# Patient Record
Sex: FEMALE | HISPANIC OR LATINO
[De-identification: names, ages, dates, MRNs, and addresses within clinical notes are randomized per-mention and may not be internally consistent; named-entity substitution may affect disease eponyms.]

---

## 2022-05-02 PROBLEM — Z00.00 ENCOUNTER FOR PREVENTIVE HEALTH EXAMINATION: Status: ACTIVE | Noted: 2022-05-02

## 2022-05-03 ENCOUNTER — APPOINTMENT (OUTPATIENT)
Dept: PEDIATRIC ALLERGY IMMUNOLOGY | Facility: CLINIC | Age: 37
End: 2022-05-03
Payer: COMMERCIAL

## 2022-05-03 VITALS
OXYGEN SATURATION: 98 % | HEIGHT: 69 IN | SYSTOLIC BLOOD PRESSURE: 104 MMHG | HEART RATE: 94 BPM | DIASTOLIC BLOOD PRESSURE: 70 MMHG | BODY MASS INDEX: 39.4 KG/M2 | WEIGHT: 265.99 LBS

## 2022-05-03 DIAGNOSIS — Z78.9 OTHER SPECIFIED HEALTH STATUS: ICD-10-CM

## 2022-05-03 DIAGNOSIS — R09.89 OTHER SPECIFIED SYMPTOMS AND SIGNS INVOLVING THE CIRCULATORY AND RESPIRATORY SYSTEMS: ICD-10-CM

## 2022-05-03 DIAGNOSIS — Z82.5 FAMILY HISTORY OF ASTHMA AND OTHER CHRONIC LOWER RESPIRATORY DISEASES: ICD-10-CM

## 2022-05-03 PROCEDURE — 95004 PERQ TESTS W/ALRGNC XTRCS: CPT

## 2022-05-03 PROCEDURE — 99244 OFF/OP CNSLTJ NEW/EST MOD 40: CPT | Mod: 25

## 2022-05-03 RX ORDER — FEXOFENADINE HYDROCHLORIDE 180 MG/1
180 TABLET ORAL DAILY
Qty: 30 | Refills: 3 | Status: ACTIVE | COMMUNITY
Start: 2022-05-03 | End: 1900-01-01

## 2022-05-03 NOTE — REASON FOR VISIT
[Initial Consultation] : an initial consultation for [Allergy Evaluation/ Skin Testing] : allergy evaluation and or skin testing [Congestion] : congestion [Runny Nose] : runny nose [Itchy Eyes] : itchy eyes [Red Eyes] : red eyes

## 2022-05-03 NOTE — CONSULT LETTER
[Dear  ___] : Dear  [unfilled], [Consult Letter:] : I had the pleasure of evaluating your patient, [unfilled]. [Please see my note below.] : Please see my note below. [Consult Closing:] : Thank you very much for allowing me to participate in the care of this patient.  If you have any questions, please do not hesitate to contact me. [Sincerely,] : Sincerely, [FreeTextEntry2] : FRANCINE DINH [FreeTextEntry3] : Ana Lilia Dhillon MD\par Attending Physician, Division of Allergy/Immunology\par [facility]

## 2022-05-03 NOTE — PHYSICAL EXAM
[Alert] : alert [Well Nourished] : well nourished [Healthy Appearance] : healthy appearance [No Acute Distress] : no acute distress [Well Developed] : well developed [Normal Pupil & Iris Size/Symmetry] : normal pupil and iris size and symmetry [No Discharge] : no discharge [No Photophobia] : no photophobia [Sclera Not Icteric] : sclera not icteric [Normal TMs] : both tympanic membranes were normal [Normal Nasal Mucosa] : the nasal mucosa was normal [Normal Lips/Tongue] : the lips and tongue were normal [Normal Outer Ear/Nose] : the ears and nose were normal in appearance [Normal Tonsils] : normal tonsils [No Thrush] : no thrush [Supple] : the neck was supple [Normal Rate and Effort] : normal respiratory rhythm and effort [No Crackles] : no crackles [No Retractions] : no retractions [Bilateral Audible Breath Sounds] : bilateral audible breath sounds [Normal Rate] : heart rate was normal  [Normal S1, S2] : normal S1 and S2 [Regular Rhythm] : with a regular rhythm [Soft] : abdomen soft [Not Tender] : non-tender [Not Distended] : not distended [No HSM] : no hepato-splenomegaly [Normal Cervical Lymph Nodes] : cervical [Skin Intact] : skin intact  [No Rash] : no rash [No Skin Lesions] : no skin lesions [No clubbing] : no clubbing [No Edema] : no edema [No Cyanosis] : no cyanosis [Alert, Awake, Oriented as Age-Appropriate] : alert, awake, oriented as age appropriate [Suborbital Bogginess] : suborbital bogginess (allergic shiners) [Posterior Pharyngeal Cobblestoning] : posterior pharyngeal cobblestoning [Pale mucosa] : no pale mucosa [Wheezing] : no wheezing was heard

## 2022-05-03 NOTE — REVIEW OF SYSTEMS
[Nl] : Genitourinary [Immunizations are up to date] : Immunizations are up to date [Received Influenza Vaccine this Past Year] : patient has received the Influenza vaccine this past year [Eye Redness] : redness [Eye Itching] : itchy eyes [Puffy Eyelids] : puffy ~T eyelids [Rhinorrhea] : rhinorrhea [Nasal Dryness] : dryness of the nose [Nasal Congestion] : nasal congestion [Nasal Itching] : nasal itching [Hoarseness] : hoarseness [Post Nasal Drip] : post nasal drip [Urticaria] : urticaria

## 2022-05-03 NOTE — SOCIAL HISTORY
[House] : [unfilled] lives in a house  [Radiator/Baseboard] : heating provided by radiator(s)/baseboard(s) [Window Units] : air conditioning provided by window units [None] : none [Humidifier] : does not use a humidifier [Dehumidifier] : does not use a dehumidifier [Cockroaches] : Patient states that there are no cockroaches in the home [Dust Mite Covers] : does not have dust mite covers [Feather Pillows] : does not have feather pillows [Feather Comforter] : does not have a feather comforter [Bedroom] : not in the bedroom [Living Area] : not in the living area [Smokers in Household] : there are no smokers in the home

## 2022-05-03 NOTE — HISTORY OF PRESENT ILLNESS
[de-identified] : Patient is a 36 yo F who is here for initial evaluation. \par Patient states that she has been having hoarse voice for many years, worse in spring and fall, and sometimes when she is eating she feels like she looses her voice. She has been using a nasal gel to clear her nose. She also has a history of frequent nose bleeds and had cauterization in the past. Patient also states that she has seasonal allergies in spring and fall, but has issues all year round, she also has throat clearing, nasal congestion, itchy red eyes all year round. \par Patient also has small blisters that show up randomly, she has been seeing dermatology who did biopsy, but no firm diagnosis was given.